# Patient Record
Sex: FEMALE | Race: ASIAN | NOT HISPANIC OR LATINO | Employment: PART TIME | URBAN - METROPOLITAN AREA
[De-identification: names, ages, dates, MRNs, and addresses within clinical notes are randomized per-mention and may not be internally consistent; named-entity substitution may affect disease eponyms.]

---

## 2022-11-28 ENCOUNTER — HOSPITAL ENCOUNTER (EMERGENCY)
Facility: MEDICAL CENTER | Age: 57
End: 2022-11-28
Attending: EMERGENCY MEDICINE
Payer: COMMERCIAL

## 2022-11-28 VITALS
OXYGEN SATURATION: 96 % | HEART RATE: 75 BPM | RESPIRATION RATE: 12 BRPM | BODY MASS INDEX: 27.89 KG/M2 | TEMPERATURE: 98.6 F | DIASTOLIC BLOOD PRESSURE: 96 MMHG | HEIGHT: 63 IN | SYSTOLIC BLOOD PRESSURE: 160 MMHG | WEIGHT: 157.41 LBS

## 2022-11-28 DIAGNOSIS — R03.0 ELEVATED BLOOD PRESSURE READING: ICD-10-CM

## 2022-11-28 DIAGNOSIS — R11.0 NAUSEA: ICD-10-CM

## 2022-11-28 DIAGNOSIS — R42 DIZZINESS: ICD-10-CM

## 2022-11-28 DIAGNOSIS — E86.0 DEHYDRATION: ICD-10-CM

## 2022-11-28 DIAGNOSIS — U07.1 COVID-19: ICD-10-CM

## 2022-11-28 LAB
ALBUMIN SERPL BCP-MCNC: 3.7 G/DL (ref 3.2–4.9)
ALBUMIN/GLOB SERPL: 1.1 G/DL
ALP SERPL-CCNC: 74 U/L (ref 30–99)
ALT SERPL-CCNC: 15 U/L (ref 2–50)
ANION GAP SERPL CALC-SCNC: 11 MMOL/L (ref 7–16)
APPEARANCE UR: CLEAR
AST SERPL-CCNC: 23 U/L (ref 12–45)
BASOPHILS # BLD AUTO: 0.2 % (ref 0–1.8)
BASOPHILS # BLD: 0.01 K/UL (ref 0–0.12)
BILIRUB SERPL-MCNC: 0.2 MG/DL (ref 0.1–1.5)
BILIRUB UR QL STRIP.AUTO: NEGATIVE
BUN SERPL-MCNC: 12 MG/DL (ref 8–22)
CALCIUM SERPL-MCNC: 9 MG/DL (ref 8.5–10.5)
CHLORIDE SERPL-SCNC: 103 MMOL/L (ref 96–112)
CO2 SERPL-SCNC: 23 MMOL/L (ref 20–33)
COLOR UR: YELLOW
CREAT SERPL-MCNC: 0.57 MG/DL (ref 0.5–1.4)
EKG IMPRESSION: NORMAL
EOSINOPHIL # BLD AUTO: 0.01 K/UL (ref 0–0.51)
EOSINOPHIL NFR BLD: 0.2 % (ref 0–6.9)
ERYTHROCYTE [DISTWIDTH] IN BLOOD BY AUTOMATED COUNT: 43.8 FL (ref 35.9–50)
FLUAV RNA SPEC QL NAA+PROBE: NEGATIVE
FLUBV RNA SPEC QL NAA+PROBE: NEGATIVE
GFR SERPLBLD CREATININE-BSD FMLA CKD-EPI: 106 ML/MIN/1.73 M 2
GLOBULIN SER CALC-MCNC: 3.5 G/DL (ref 1.9–3.5)
GLUCOSE SERPL-MCNC: 100 MG/DL (ref 65–99)
GLUCOSE UR STRIP.AUTO-MCNC: NEGATIVE MG/DL
HCT VFR BLD AUTO: 52.6 % (ref 37–47)
HGB BLD-MCNC: 17.8 G/DL (ref 12–16)
IMM GRANULOCYTES # BLD AUTO: 0.01 K/UL (ref 0–0.11)
IMM GRANULOCYTES NFR BLD AUTO: 0.2 % (ref 0–0.9)
KETONES UR STRIP.AUTO-MCNC: NEGATIVE MG/DL
LEUKOCYTE ESTERASE UR QL STRIP.AUTO: NEGATIVE
LIPASE SERPL-CCNC: 20 U/L (ref 11–82)
LYMPHOCYTES # BLD AUTO: 1.7 K/UL (ref 1–4.8)
LYMPHOCYTES NFR BLD: 41.3 % (ref 22–41)
MCH RBC QN AUTO: 30.6 PG (ref 27–33)
MCHC RBC AUTO-ENTMCNC: 33.8 G/DL (ref 33.6–35)
MCV RBC AUTO: 90.4 FL (ref 81.4–97.8)
MICRO URNS: NORMAL
MONOCYTES # BLD AUTO: 0.27 K/UL (ref 0–0.85)
MONOCYTES NFR BLD AUTO: 6.6 % (ref 0–13.4)
NEUTROPHILS # BLD AUTO: 2.12 K/UL (ref 2–7.15)
NEUTROPHILS NFR BLD: 51.5 % (ref 44–72)
NITRITE UR QL STRIP.AUTO: NEGATIVE
NRBC # BLD AUTO: 0 K/UL
NRBC BLD-RTO: 0 /100 WBC
PH UR STRIP.AUTO: 6.5 [PH] (ref 5–8)
PLATELET # BLD AUTO: 214 K/UL (ref 164–446)
PMV BLD AUTO: 10.5 FL (ref 9–12.9)
POTASSIUM SERPL-SCNC: 4.1 MMOL/L (ref 3.6–5.5)
PROT SERPL-MCNC: 7.2 G/DL (ref 6–8.2)
PROT UR QL STRIP: NEGATIVE MG/DL
RBC # BLD AUTO: 5.82 M/UL (ref 4.2–5.4)
RBC UR QL AUTO: NEGATIVE
RSV RNA SPEC QL NAA+PROBE: NEGATIVE
SARS-COV-2 RNA RESP QL NAA+PROBE: DETECTED
SODIUM SERPL-SCNC: 137 MMOL/L (ref 135–145)
SP GR UR STRIP.AUTO: 1.02
SPECIMEN SOURCE: ABNORMAL
TROPONIN T SERPL-MCNC: <6 NG/L (ref 6–19)
UROBILINOGEN UR STRIP.AUTO-MCNC: 0.2 MG/DL
WBC # BLD AUTO: 4.1 K/UL (ref 4.8–10.8)

## 2022-11-28 PROCEDURE — 96374 THER/PROPH/DIAG INJ IV PUSH: CPT

## 2022-11-28 PROCEDURE — 94760 N-INVAS EAR/PLS OXIMETRY 1: CPT

## 2022-11-28 PROCEDURE — 83690 ASSAY OF LIPASE: CPT

## 2022-11-28 PROCEDURE — 0241U HCHG SARS-COV-2 COVID-19 NFCT DS RESP RNA 4 TRGT MIC: CPT

## 2022-11-28 PROCEDURE — 84484 ASSAY OF TROPONIN QUANT: CPT

## 2022-11-28 PROCEDURE — 700111 HCHG RX REV CODE 636 W/ 250 OVERRIDE (IP): Performed by: EMERGENCY MEDICINE

## 2022-11-28 PROCEDURE — C9803 HOPD COVID-19 SPEC COLLECT: HCPCS | Performed by: EMERGENCY MEDICINE

## 2022-11-28 PROCEDURE — 700101 HCHG RX REV CODE 250: Performed by: EMERGENCY MEDICINE

## 2022-11-28 PROCEDURE — 93005 ELECTROCARDIOGRAM TRACING: CPT | Performed by: EMERGENCY MEDICINE

## 2022-11-28 PROCEDURE — 85025 COMPLETE CBC W/AUTO DIFF WBC: CPT

## 2022-11-28 PROCEDURE — 36415 COLL VENOUS BLD VENIPUNCTURE: CPT

## 2022-11-28 PROCEDURE — 99285 EMERGENCY DEPT VISIT HI MDM: CPT

## 2022-11-28 PROCEDURE — 700105 HCHG RX REV CODE 258: Performed by: EMERGENCY MEDICINE

## 2022-11-28 PROCEDURE — 96375 TX/PRO/DX INJ NEW DRUG ADDON: CPT

## 2022-11-28 PROCEDURE — 93005 ELECTROCARDIOGRAM TRACING: CPT

## 2022-11-28 PROCEDURE — 81003 URINALYSIS AUTO W/O SCOPE: CPT

## 2022-11-28 PROCEDURE — 80053 COMPREHEN METABOLIC PANEL: CPT

## 2022-11-28 RX ORDER — MECLIZINE HYDROCHLORIDE 25 MG/1
25 TABLET ORAL 3 TIMES DAILY PRN
Qty: 30 TABLET | Refills: 0 | Status: SHIPPED | OUTPATIENT
Start: 2022-11-28

## 2022-11-28 RX ORDER — ONDANSETRON 4 MG/1
4 TABLET, ORALLY DISINTEGRATING ORAL EVERY 8 HOURS PRN
Qty: 10 TABLET | Refills: 0 | Status: SHIPPED | OUTPATIENT
Start: 2022-11-28

## 2022-11-28 RX ORDER — LABETALOL HYDROCHLORIDE 5 MG/ML
10 INJECTION, SOLUTION INTRAVENOUS ONCE
Status: COMPLETED | OUTPATIENT
Start: 2022-11-28 | End: 2022-11-28

## 2022-11-28 RX ORDER — SODIUM CHLORIDE 9 MG/ML
1000 INJECTION, SOLUTION INTRAVENOUS ONCE
Status: COMPLETED | OUTPATIENT
Start: 2022-11-28 | End: 2022-11-28

## 2022-11-28 RX ORDER — ONDANSETRON 2 MG/ML
4 INJECTION INTRAMUSCULAR; INTRAVENOUS ONCE
Status: COMPLETED | OUTPATIENT
Start: 2022-11-28 | End: 2022-11-28

## 2022-11-28 RX ADMIN — SODIUM CHLORIDE 1000 ML: 9 INJECTION, SOLUTION INTRAVENOUS at 15:13

## 2022-11-28 RX ADMIN — LABETALOL HYDROCHLORIDE 10 MG: 5 INJECTION, SOLUTION INTRAVENOUS at 17:44

## 2022-11-28 RX ADMIN — ONDANSETRON 4 MG: 2 INJECTION INTRAMUSCULAR; INTRAVENOUS at 15:13

## 2022-11-28 NOTE — ED TRIAGE NOTES
"Chief Complaint   Patient presents with    Dizziness     X 5 days. Pt states she has lost consciousness. Pt denies head strike.     Nausea    Body Aches    Abdominal Pain     Mid abdomen     BP (!) 170/113   Pulse 76   Temp 36.9 °C (98.5 °F) (Temporal)   Resp 19   Ht 1.6 m (5' 3\")   Wt 71.4 kg (157 lb 6.5 oz)   SpO2 96%   BMI 27.88 kg/m²     "

## 2022-11-28 NOTE — ED PROVIDER NOTES
ED Provider Note    Scribed for Yusuf Lee M.D. by Yadira Salazar. 11/28/2022, 2:40 PM.    Primary care provider: Pcp Pt States None  Means of arrival: Walk in   History obtained from: Patient  History limited by: None    CHIEF COMPLAINT  Chief Complaint   Patient presents with    Dizziness     X 5 days. Pt states she has lost consciousness. Pt denies head strike.     Nausea    Body Aches    Abdominal Pain     Mid abdomen       HPI  Jaz Arellano is a 57 y.o. female who presents to the Emergency Department for evaluation of constant lightheadedness onset five days. Patient reports that the lightheadedness is the same whether she is standing, laying down, or moving. Also, per family members, patient was sitting down when she had a syncopal episode; they report that this occurs occasionally. She admits to associated symptoms of nausea, abdominal pain, dysuria, vaginal bleeding, headache, and shortness of breath but denies vomiting, chest pain, diarrhea, hematochezia, or back pain. No alleviating factors were reported. Patient reports that the vaginal bleeding was minimal. Patient has not been up out of her bed in about five days secondary to the lightheadedness. Mamta has a history of 6 pregnancies with 6 living children. No history of heart attacks or stents in the heart. She does not take blood pressure medication.       REVIEW OF SYSTEMS  Pertinent positives include lightheadedness, syncope, nausea, abdominal pain, dysuria, vaginal bleeding, headache, and shortness of breath. Pertinent negatives include vomiting, chest pain, diarrhea, hematochezia, or back pain. All other systems negative.    PAST MEDICAL HISTORY  None noted.     SURGICAL HISTORY  patient denies any surgical history    SOCIAL HISTORY  Social History     Tobacco Use    Smoking status: Every Day     Packs/day: 0.50     Types: Cigarettes    Smokeless tobacco: Never    Tobacco comments:     one pack per day   Vaping Use    Vaping Use:  "Never used   Substance Use Topics    Alcohol use: Not Currently     Comment: occasionally    Drug use: No      Social History     Substance and Sexual Activity   Drug Use No       FAMILY HISTORY  History reviewed. No pertinent family history.    CURRENT MEDICATIONS  Home Medications       Reviewed by Mandy Ochoa R.N. (Registered Nurse) on 11/28/22 at 1156  Med List Status: Not Addressed     Medication Last Dose Status   benzonatate (TESSALON) 100 MG Cap  Active   ibuprofen (MOTRIN) 600 MG Tab  Active                    ALLERGIES  Allergies   Allergen Reactions    Nkda [No Known Drug Allergy]        PHYSICAL EXAM  VITAL SIGNS: BP (!) 170/113   Pulse 76   Temp 36.9 °C (98.5 °F) (Temporal)   Resp 19   Ht 1.6 m (5' 3\")   Wt 71.4 kg (157 lb 6.5 oz)   SpO2 96%   BMI 27.88 kg/m²     Constitutional: Well developed, Well nourished, mild distress.   HENT: Normocephalic, Atraumatic, mask in place.  Eyes: Conjunctiva normal, No discharge.   Neck: Supple, No stridor   Cardiovascular: Normal heart rate, Normal rhythm, No murmurs, equal pulses.   Pulmonary: Normal breath sounds, No respiratory distress, No wheezing, No rales, No rhonchi.  Chest: No chest wall tenderness or deformity.   Abdomen:Soft, mild supra pubic tenderness, No masses, no rebound, no guarding.   Back: No CVA tenderness.   Musculoskeletal: No major deformities noted, No tenderness. No edema in legs.   Skin: Warm, Dry, No erythema, No rash.   Neurologic: Alert & oriented x 3, Normal motor function,  No focal deficits noted.   Psychiatric: Affect normal, Judgment normal, Mood normal.       LABS  Results for orders placed or performed during the hospital encounter of 11/28/22   CBC with Differential   Result Value Ref Range    WBC 4.1 (L) 4.8 - 10.8 K/uL    RBC 5.82 (H) 4.20 - 5.40 M/uL    Hemoglobin 17.8 (H) 12.0 - 16.0 g/dL    Hematocrit 52.6 (H) 37.0 - 47.0 %    MCV 90.4 81.4 - 97.8 fL    MCH 30.6 27.0 - 33.0 pg    MCHC 33.8 33.6 - 35.0 g/dL    RDW " 43.8 35.9 - 50.0 fL    Platelet Count 214 164 - 446 K/uL    MPV 10.5 9.0 - 12.9 fL    Neutrophils-Polys 51.50 44.00 - 72.00 %    Lymphocytes 41.30 (H) 22.00 - 41.00 %    Monocytes 6.60 0.00 - 13.40 %    Eosinophils 0.20 0.00 - 6.90 %    Basophils 0.20 0.00 - 1.80 %    Immature Granulocytes 0.20 0.00 - 0.90 %    Nucleated RBC 0.00 /100 WBC    Neutrophils (Absolute) 2.12 2.00 - 7.15 K/uL    Lymphs (Absolute) 1.70 1.00 - 4.80 K/uL    Monos (Absolute) 0.27 0.00 - 0.85 K/uL    Eos (Absolute) 0.01 0.00 - 0.51 K/uL    Baso (Absolute) 0.01 0.00 - 0.12 K/uL    Immature Granulocytes (abs) 0.01 0.00 - 0.11 K/uL    NRBC (Absolute) 0.00 K/uL   Complete Metabolic Panel   Result Value Ref Range    Sodium 137 135 - 145 mmol/L    Potassium 4.1 3.6 - 5.5 mmol/L    Chloride 103 96 - 112 mmol/L    Co2 23 20 - 33 mmol/L    Anion Gap 11.0 7.0 - 16.0    Glucose 100 (H) 65 - 99 mg/dL    Bun 12 8 - 22 mg/dL    Creatinine 0.57 0.50 - 1.40 mg/dL    Calcium 9.0 8.5 - 10.5 mg/dL    AST(SGOT) 23 12 - 45 U/L    ALT(SGPT) 15 2 - 50 U/L    Alkaline Phosphatase 74 30 - 99 U/L    Total Bilirubin 0.2 0.1 - 1.5 mg/dL    Albumin 3.7 3.2 - 4.9 g/dL    Total Protein 7.2 6.0 - 8.2 g/dL    Globulin 3.5 1.9 - 3.5 g/dL    A-G Ratio 1.1 g/dL   Lipase   Result Value Ref Range    Lipase 20 11 - 82 U/L   Urinalysis    Specimen: Urine   Result Value Ref Range    Color Yellow     Character Clear     Specific Gravity 1.020 <1.035    Ph 6.5 5.0 - 8.0    Glucose Negative Negative mg/dL    Ketones Negative Negative mg/dL    Protein Negative Negative mg/dL    Bilirubin Negative Negative    Urobilinogen, Urine 0.2 Negative    Nitrite Negative Negative    Leukocyte Esterase Negative Negative    Occult Blood Negative Negative    Micro Urine Req see below    ESTIMATED GFR   Result Value Ref Range    GFR (CKD-EPI) 106 >60 mL/min/1.73 m 2   CoV-2, FLU A/B, and RSV by PCR (2-4 Hours CEPHEID) : Collect NP swab in VTM    Specimen: Respirate   Result Value Ref Range    Influenza  virus A RNA Negative Negative    Influenza virus B, PCR Negative Negative    RSV, PCR Negative Negative    SARS-CoV-2 by PCR DETECTED (AA)     SARS-CoV-2 Source NP Swab    TROPONIN   Result Value Ref Range    Troponin T <6 6 - 19 ng/L   EKG   Result Value Ref Range    Report       [facility] Emergency Dept.    Test Date:  2022  Pt Name:    YU LINDO              Department: ER  MRN:        [id number]                      Room:  Gender:     Female                       Technician: [id number]  :        [id number]                   Requested By:ER TRIAGE PROTOCOL  Order #:    [id number]                    Reading MD: ANALI CHAMORRO MD    Measurements  Intervals                                Axis  Rate:       73                           P:          52  CT:         130                          QRS:        132  QRSD:       95                           T:          53  QT:         413  QTc:        456    Interpretive Statements  Sinus rhythm, rate of 73, normal axis,  Left posterior fascicular block  Compared to ECG 2016 18:22:49  Left posterior fascicular block now present  Posterior QRS axis no longer present  Electronically Signed On 2022 14:42:09 PST by ANALI CHAMORRO MD       All labs reviewed by me.    EKG  12 Lead EKG interpreted by me as shown above.     COURSE & MEDICAL DECISION MAKING  Pertinent Labs & Imaging studies reviewed. (See chart for details)    2:40 PM - Patient seen and examined at bedside. Patient will be treated with Zofran injection 4 mg and NS infusion 1,000 mL. The patient will receive IV fluids for dehydration. Ordered COVID swab, Troponin, CBC with diff, CMP, Lipase, Urinalysis, and EKG to evaluate her symptoms. The differential diagnoses include but are not limited to: Anemia vs UTI vs Myocardial infarction. I informed patient that we will treat her nausea. We will further evaluate her symptoms with blood work and scans. Her EKG so far looks  good. Patient verbalizes understanding and agreement to this plan of care.     5:33 PM - Patient was reevaluated at bedside. Discussed lab  results with the patient and informed them that she has COVID. Will plan to treat patient with medication to lower her blood pressure. Patient verbalizes understanding and agreement to this plan of care.     5:33 PM - Will treat patient with Labetalol injection 10 mg.     6:32 PM - Patient was reevaluated at bedside. Discussed lab and radiology results with the patient. I discussed plan for discharge and follow up as outlined below. You l will need to remain in home quarantine until all three of the following are true:  You are 5 days from symptom onset,   your symptoms are improving   you have been fever free for at least  24 hours without taking any Tylenol or ibuprofen.  4.   you will have to wear a mask when around anyone else for 10 days from the onset of symptoms.  If you develop significant shortness of breath, meaning that it is difficult for you to walk even short distances without having to stop and catch your breath, or you become severely dizzy and this is persistent then please return to the emergency department.    I recommend you get a home pulse oximeter.  Return if your oxygenation is less than 90. Your blood pressure was elevated today please follow-up with your doctor to have it rechecked in a week.  The patient is stable for discharge at this time and will return for any new or worsening symptoms. Patient verbalizes understanding and support with my plan for discharge.       HYDRATION: Based on the patient's presentation of Dehydration the patient was given IV fluids. IV Hydration was used because oral hydration was not as rapid as required. Upon recheck following hydration, the patient was mildly improved.      Medical Decision Making: At this point time I think the patient's feeling lightheaded is likely secondary to dehydration as well as COVID-19.  Her  orthostatics are negative.  The dizziness is not room spinning and seems to be positional I do not think this is a cerebellar stroke.  Patient was given 1 L of IV fluids but given her COVID-19 I do not think she will need excessive IV fluids as this may jeopardize her pulmonary function.  Discussed staying well-hydrated with the patient.  Patient is 5 days into her symptoms and therefore outside the window for Paxlovid.  I will give her Zofran to help with her nausea.  Patient does not show any signs of respiratory distress I do not think she needs admission to the hospital at this point time.     The patient will return for new or worsening symptoms and is stable at the time of discharge.    The patient is referred to a primary physician for blood pressure management, diabetic screening, and for all other preventative health concerns.    DISPOSITION:  Patient will be discharged home in stable condition.    FOLLOW UP:  Your doctor    Schedule an appointment as soon as possible for a visit in 1 week      [facility]  [address] [place] [id number] [phone]    If you need a doctor    [address] [id number] [phone]    If you need a doctor      OUTPATIENT MEDICATIONS:  Discharge Medication List as of 11/28/2022  6:34 PM        START taking these medications    Details   ondansetron (ZOFRAN ODT) 4 MG TABLET DISPERSIBLE Take 1 Tablet by mouth every 8 hours as needed for Nausea/Vomiting., Disp-10 Tablet, R-0, Normal                FINAL IMPRESSION  1. Nausea    2. Dizziness    3. COVID-19    4. Elevated blood pressure reading    5. Dehydration          Yadira SULLIVAN (Kumar), am scribing for, and in the presence of, Yusuf Lee M.D.    Electronically signed by: Yadira Salazar (Kumar), 11/28/2022    Yusuf SULLIVAN M.D. personally performed the services described in this documentation, as scribed by Yadira Salazar in my  presence, and it is both accurate and complete.    The note accurately reflects work and decisions made by me.  Yusuf Lee M.D.  11/29/2022  12:19 AM

## 2022-11-29 NOTE — DISCHARGE INSTRUCTIONS
You l will need to remain in home quarantine until all three of the following are true:  You are 5 days from symptom onset,   your symptoms are improving   you have been fever free for at least  24 hours without taking any Tylenol or ibuprofen.  4.   you will have to wear a mask when around anyone else for 10 days from the onset of symptoms.  If you develop significant shortness of breath, meaning that it is difficult for you to walk even short distances without having to stop and catch your breath, or you become severely dizzy and this is persistent then please return to the emergency department.    I recommend you get a home pulse oximeter.  Return if your oxygenation is less than 90.    Your blood pressure was elevated today please follow-up with your doctor to have it rechecked in a week.

## 2025-02-10 ENCOUNTER — PHARMACY VISIT (OUTPATIENT)
Dept: PHARMACY | Facility: MEDICAL CENTER | Age: 60
End: 2025-02-10
Payer: COMMERCIAL

## 2025-02-10 ENCOUNTER — HOSPITAL ENCOUNTER (EMERGENCY)
Facility: MEDICAL CENTER | Age: 60
End: 2025-02-10
Attending: EMERGENCY MEDICINE
Payer: COMMERCIAL

## 2025-02-10 VITALS
HEART RATE: 90 BPM | HEIGHT: 65 IN | WEIGHT: 165.79 LBS | TEMPERATURE: 98.7 F | DIASTOLIC BLOOD PRESSURE: 89 MMHG | SYSTOLIC BLOOD PRESSURE: 145 MMHG | OXYGEN SATURATION: 98 % | BODY MASS INDEX: 27.62 KG/M2 | RESPIRATION RATE: 16 BRPM

## 2025-02-10 DIAGNOSIS — K04.7 DENTAL ABSCESS: ICD-10-CM

## 2025-02-10 PROCEDURE — 96374 THER/PROPH/DIAG INJ IV PUSH: CPT

## 2025-02-10 PROCEDURE — 96375 TX/PRO/DX INJ NEW DRUG ADDON: CPT

## 2025-02-10 PROCEDURE — RXMED WILLOW AMBULATORY MEDICATION CHARGE: Performed by: EMERGENCY MEDICINE

## 2025-02-10 PROCEDURE — 700111 HCHG RX REV CODE 636 W/ 250 OVERRIDE (IP): Mod: JZ,UD | Performed by: EMERGENCY MEDICINE

## 2025-02-10 PROCEDURE — 99284 EMERGENCY DEPT VISIT MOD MDM: CPT

## 2025-02-10 RX ORDER — NAPROXEN 375 MG/1
375 TABLET ORAL 2 TIMES DAILY WITH MEALS
Qty: 20 TABLET | Refills: 0 | Status: SHIPPED | OUTPATIENT
Start: 2025-02-10

## 2025-02-10 RX ORDER — MORPHINE SULFATE 4 MG/ML
4 INJECTION INTRAVENOUS ONCE
Status: COMPLETED | OUTPATIENT
Start: 2025-02-10 | End: 2025-02-10

## 2025-02-10 RX ORDER — CEFTRIAXONE 2 G/1
2000 INJECTION, POWDER, FOR SOLUTION INTRAMUSCULAR; INTRAVENOUS ONCE
Status: COMPLETED | OUTPATIENT
Start: 2025-02-10 | End: 2025-02-10

## 2025-02-10 RX ADMIN — CEFTRIAXONE SODIUM 2000 MG: 2 INJECTION, POWDER, FOR SOLUTION INTRAMUSCULAR; INTRAVENOUS at 16:34

## 2025-02-10 RX ADMIN — MORPHINE SULFATE 4 MG: 4 INJECTION, SOLUTION INTRAMUSCULAR; INTRAVENOUS at 16:34

## 2025-02-10 NOTE — ED TRIAGE NOTES
"Chief Complaint   Patient presents with    Dental Pain     Patient states that she woke up with right lower jaw swelling. She is able to speak and swallow. Hx abscessed tooth       Patient to triage ambulatory with a steady gait, AAOx4, Appropriate precautions in place.     Explained wait time and triage process. Placed back in lobby. Told to notify ED tech or RN of any changes, verbalized understanding.    BP (!) 184/108   Pulse (!) 102   Temp 36.1 °C (97 °F) (Temporal)   Resp 18   Ht 1.651 m (5' 5\")   Wt 75.2 kg (165 lb 12.6 oz)   LMP 11/01/2012   SpO2 95%   BMI 27.59 kg/m²     "

## 2025-02-10 NOTE — ED NOTES
Patient Identifiers verified; patient ambulated to room with a steady gait accompanied by family member; charted the Patient for an Emergency Room Physician to see.

## 2025-02-10 NOTE — ED PROVIDER NOTES
ER Provider Note    Scribed for Rivas Farmer D.O. by Maura Bojorquez. 2/10/2025  3:36 PM    Primary Care Provider: Pcp Pt States None    CHIEF COMPLAINT  Chief Complaint   Patient presents with    Dental Pain     Patient states that she woke up with right lower jaw swelling. She is able to speak and swallow. Hx abscessed tooth       HPI/ROS  LIMITATION TO HISTORY   None.    OUTSIDE HISTORIAN(S):  Family at bedside to confirm sequence of events and collateral information provided. See HPI below.     Jaz Arellano is a 59 y.o. female who presents to the Emergency Department for evaluation of right lower dental pain and right sided facial swelling onset this morning. She describes that she has a hole to one of her teeth on the right lower side. The patient started to have pain yesterday and woke up this morning with significant right sided facial swelling. The patient's family reports that the patient used Oragel yesterday with no alleviation. The patient denies any fevers, chills or difficulty swallowing. She is able to speak. The family reports that the patient does not have a dentist.     ROS as per HPI.    PAST MEDICAL HISTORY  History reviewed. No pertinent past medical history.    SURGICAL HISTORY  History reviewed. No pertinent surgical history.    FAMILY HISTORY  History reviewed. No pertinent family history.    SOCIAL HISTORY   reports that she has been smoking cigarettes. She has never used smokeless tobacco. She reports that she does not currently use alcohol. She reports that she does not use drugs.    CURRENT MEDICATIONS  Discharge Medication List as of 2/10/2025  4:59 PM        CONTINUE these medications which have NOT CHANGED    Details   ondansetron (ZOFRAN ODT) 4 MG TABLET DISPERSIBLE Take 1 Tablet by mouth every 8 hours as needed for Nausea/Vomiting., Disp-10 Tablet, R-0, Normal      meclizine (ANTIVERT) 25 MG Tab Take 1 Tablet by mouth 3 times a day as needed for Dizziness.,  "Disp-30 Tablet, R-0, Normal      benzonatate (TESSALON) 100 MG Cap Take 1 Cap by mouth 3 times a day as needed for Cough., Disp-60 Cap, R-0, Print Rx Paper      ibuprofen (MOTRIN) 600 MG Tab Take 1 Tab by mouth every 6 hours as needed for Moderate Pain., Disp-30 Tab, R-0, Print Rx Paper           ALLERGIES  Nkda [no known drug allergy]      PHYSICAL EXAM  BP (!) 184/108   Pulse (!) 102   Temp 36.1 °C (97 °F) (Temporal)   Resp 18   Ht 1.651 m (5' 5\")   Wt 75.2 kg (165 lb 12.6 oz)   LMP 11/01/2012   SpO2 95%   BMI 27.59 kg/m²     General: No acute distress.  HENT: Normocephalic, Mucus membranes are moist. Right mandible swelling. No erythema. Right lower teeth are eroded to the gum line.   Abdomen: Non distended.  Neuro: Awake, Conversive, Able to relay recent events.  Psychiatric: Calm and cooperative.       INITIAL ASSESSMENT  There is swelling with no palpable abscess. Will treat with IV antibiotics and pain medication. Anticipate this will turn into an abscess that will require drainage. She will need to return if it worsens.     ED Observation Status? No; Patient does not meet criteria for ED Observation.       COURSE & MEDICAL DECISION MAKING     COURSE AND PLAN  3:36 PM - Patient seen and examined at bedside. This is a 59 year old woman who presents to the emergency department for evaluation of right lower dental pain and right sided facial swelling. Discussed plan of care, including treating her pain and for possible infection. Patient agrees to the plan of care. The patient will be medicated with morphine 4 mg and Rocephin 2,000 mg.     ED Summary: Patient is significant swelling in the right mandibular area.  She has teeth that are cavitary and eroded down to the gumline.  There is no palpable abscess at this time and no induration.  She was placed on IV antibiotics and pain medication the pain medication did help her pain she is discharged home with antibiotic and pain medication I suspect this will " develop into an abscess that will require drainage I did explain this to the patient and to return in 24 to 48 hours if it is not improved and to return immediately if it is worsening.    She is able to tolerate her secretions and she is able to speak to me      DISPOSITION AND DISCUSSIONS  I have discussed management of the patient with the following physicians and EDER's: None.    Discussion of management with other [facility] or appropriate source(s): None.    Barriers to care at this time, including but not limited to: The patient does not have a primary care physician.      The patient will return for new or worsening symptoms and is stable at the time of discharge.    DISPOSITION:  Patient will be discharged home in stable condition.    FOLLOW UP:    Call dentist to make a follow-up appointment  In 1 week        OUTPATIENT MEDICATIONS:  Discharge Medication List as of 2/10/2025  4:59 PM        START taking these medications    Details   amoxicillin-clavulanate (AUGMENTIN) 875-125 MG Tab Take 1 Tablet by mouth 2 times a day., Disp-20 Tablet, R-0, Normal      naproxen (NAPROSYN) 375 MG Tab Take 1 Tablet by mouth 2 times a day with meals., Disp-20 Tablet, R-0, Normal              FINAL DIAGNOSIS  1. Dental abscess      Maura SULLIVAN (Scribe), am scribing for, and in the presence of, Rivas Farmer D.O..    Electronically signed by: Maura Bojorquez (Scribe), 2/10/2025    IRivas D.O. personally performed the services described in this documentation, as scribed by Maura Bojorquez in my presence, and it is both accurate and complete.     The note accurately reflects work and decisions made by me.  Rivas Farmer D.O.  2/10/2025  5:24 PM

## 2025-02-11 NOTE — DISCHARGE INSTRUCTIONS
Please take antibiotics and pain medication as prescribed.  Return if swelling worsens.  Return if this is not improved within 48 hours.

## 2025-02-11 NOTE — ED NOTES
PIV removed. Patient provided discharge instructions and medication information. Patient verbalizes understanding and denies any further questions. Patient instructed to return if condition worsens. No distress noted. Patient ambulated to the front lobby with a steady gait and all belongings.